# Patient Record
Sex: MALE | Race: BLACK OR AFRICAN AMERICAN | NOT HISPANIC OR LATINO | Employment: FULL TIME | URBAN - METROPOLITAN AREA
[De-identification: names, ages, dates, MRNs, and addresses within clinical notes are randomized per-mention and may not be internally consistent; named-entity substitution may affect disease eponyms.]

---

## 2017-02-02 ENCOUNTER — OFFICE VISIT (OUTPATIENT)
Dept: MEDICAL GROUP | Facility: MEDICAL CENTER | Age: 57
End: 2017-02-02
Payer: COMMERCIAL

## 2017-02-02 VITALS
HEART RATE: 90 BPM | BODY MASS INDEX: 28.1 KG/M2 | TEMPERATURE: 96.7 F | WEIGHT: 226 LBS | OXYGEN SATURATION: 100 % | DIASTOLIC BLOOD PRESSURE: 74 MMHG | HEIGHT: 75 IN | SYSTOLIC BLOOD PRESSURE: 124 MMHG | RESPIRATION RATE: 16 BRPM

## 2017-02-02 DIAGNOSIS — R25.2 MUSCLE CRAMP: ICD-10-CM

## 2017-02-02 DIAGNOSIS — F17.200 TOBACCO USE DISORDER: ICD-10-CM

## 2017-02-02 DIAGNOSIS — Z13.6 SCREENING FOR CARDIOVASCULAR CONDITION: ICD-10-CM

## 2017-02-02 DIAGNOSIS — N52.9 ERECTILE DYSFUNCTION, UNSPECIFIED ERECTILE DYSFUNCTION TYPE: ICD-10-CM

## 2017-02-02 DIAGNOSIS — M62.838 MUSCLE SPASM: ICD-10-CM

## 2017-02-02 PROCEDURE — 99214 OFFICE O/P EST MOD 30 MIN: CPT | Performed by: FAMILY MEDICINE

## 2017-02-02 RX ORDER — SILDENAFIL 100 MG/1
100 TABLET, FILM COATED ORAL PRN
Qty: 10 TAB | Refills: 3 | Status: SHIPPED
Start: 2017-02-02 | End: 2017-11-07 | Stop reason: SDUPTHER

## 2017-02-02 ASSESSMENT — PATIENT HEALTH QUESTIONNAIRE - PHQ9: CLINICAL INTERPRETATION OF PHQ2 SCORE: 0

## 2017-02-02 NOTE — PROGRESS NOTES
cc:  Muscle cramps    Subjective:     Ap Blanco is a 56 y.o. male presenting to discuss the followin. Erectile dysfunction: continues to report having difficulty maintaining an erection when trying to have sex with his long-term girlfriend of 2 years.  This started several months ago, is not improving.  His girlfriend does have some health issues that he is concerned about.  Erection strength and orgasm is normal with manual stimulation.  He tried a sample of viagra from the , thinks that he took 50mg, which was somewhat helpful.  Denies any urinary issues.  No hx of hypertension or diabetes.  No genital rashes, discharge, changes, polyuria.    2. Muscle cramps: has intermittent cramps/spasms in the back of his thighs for at least 2-3 years.  Is near daily, lasts for about 2-3 minutes, very painful. No particular triggers but can happen while in bed or while walking.  Better with nothing.  Worse with nothing.  Has tried stretching, massages, potassium pills with no improvement.  Does not take any meds or supplements    Review of systems:  See above.          Current outpatient prescriptions:   •  sildenafil citrate (VIAGRA) 100 MG tablet, Take 1 Tab by mouth as needed for Erectile Dysfunction., Disp: 10 Tab, Rfl: 3    Allergies   Allergen Reactions   • Aspirin        History reviewed. No pertinent past medical history.  Past Surgical History   Procedure Laterality Date   • Finger orif  10/16/2014     Performed by Henry Montez M.D. at SURGERY [facility]   • Irrigation & debridement general  10/16/2014     Performed by Henry Montez M.D. at SURGERY [facility]     Family History   Problem Relation Age of Onset   • Hypertension Mother    • Lung Disease Brother    • Alcohol/Drug Brother    • Diabetes Mother    • Hypertension Father    • Diabetes Father      Social History     Social History   • Marital Status: Single     Spouse Name: N/A   • Number of Children: N/A   • Years  "of Education: N/A     Occupational History   • Not on file.     Social History Main Topics   • Smoking status: Current Every Day Smoker -- 1.00 packs/day     Types: Cigarettes   • Smokeless tobacco: Never Used   • Alcohol Use: 0.0 oz/week     0 Standard drinks or equivalent per week      Comment: rarely, social occasions   • Drug Use: Yes      Comment: marijuana occasionally   • Sexual Activity:     Partners: Female     Other Topics Concern   • Not on file     Social History Narrative    Is an  at the [employer], works graveyard shift. Youngest of 6 brothers.       Objective:     Vitals: /74 mmHg  Pulse 90  Temp(Src) 35.9 °C (96.7 °F)  Resp 16  Ht 1.905 m (6' 3\")  Wt 102.513 kg (226 lb)  BMI 28.25 kg/m2  SpO2 100%  General: Alert, pleasant, NAD  HEENT: Normocephalic.    Psych:  Affect/mood is normal, judgement is good, memory is intact, grooming is appropriate.    Assessment/Plan:     Ap was seen today for spasms.    Diagnoses and all orders for this visit:    Erectile dysfunction, unspecified erectile dysfunction type  Screening for cardiovascular condition  Offered referral to counseling as his ED is only with his partner and not with manual stimulation, he declined.  Will try viagra 100mg prn. Will also check lipids and a1c.  Advised to quit smoking as well as this may be a contributor    -     HEMOGLOBIN A1C; Future  -     LIPID PROFILE; Future  -     sildenafil citrate (VIAGRA) 100 MG tablet; Take 1 Tab by mouth as needed for Erectile Dysfunction.    Tobacco use disorder  Advised to quit, pt not interested, he will let me know when ready.    Muscle spasm  Muscle cramp  Will check electrolytes, continue to massage and stretch, suggested trying some tonic water.  Could also consider multivitamin/b complex or benadryl.  -     BASIC METABOLIC PANEL; Future  -     FERRITIN; Future  -     MAGNESIUM; Future  -     CREATINE KINASE; Future      Return if symptoms worsen or fail to improve.  "

## 2017-02-02 NOTE — MR AVS SNAPSHOT
"Ap Blanco   2017 9:40 AM   Office Visit   MRN: [id number]    Department:  [address] [place] [address]   Dept Phone:  [phone]    Description:  Male : 1960   Provider:  Sebastián Barreto M.D.           Reason for Visit     Spasms           Allergies as of 2017     Allergen Noted Reactions    Aspirin 10/16/2014         You were diagnosed with     Erectile dysfunction, unspecified erectile dysfunction type   [1487043]       Screening for cardiovascular condition   [632931]       Tobacco use disorder   [305.1.ICD-9-CM]       Muscle spasm   [306752]       Muscle cramp   [807213]         Vital Signs     Blood Pressure Pulse Temperature Respirations Height Weight    124/74 mmHg 90 35.9 °C (96.7 °F) 16 1.905 m (6' 3\") 102.513 kg (226 lb)    Body Mass Index Oxygen Saturation Smoking Status             28.25 kg/m2 100% Current Every Day Smoker         Basic Information     Date Of Birth Sex Race Ethnicity Preferred Language    1960 Male Black or  Non- English      Problem List              ICD-10-CM Priority Class Noted - Resolved    Finger injury S69.90XA   10/16/2014 - Present    Erectile dysfunction N52.9   2016 - Present    Heartburn R12   2016 - Present    Tobacco use disorder F17.200   2016 - Present    Muscle cramp R25.2   2017 - Present      Health Maintenance        Date Due Completion Dates    IMM PNEUMOCOCCAL 19-64 (ADULT) MEDIUM RISK SERIES (1 of 1 - PPSV23) 1978 ---    COLONOSCOPY 2009 ---    IMM INFLUENZA (1) 2016 ---    IMM DTaP/Tdap/Td Vaccine (2 - Td) 10/16/2024 10/16/2014            Current Immunizations     Tdap Vaccine 10/16/2014 12:02 PM      Below and/or attached are the medications your provider expects you to take. Review all of your home medications and newly ordered medications with your provider and/or pharmacist. Follow medication instructions as directed by your provider and/or pharmacist. Please keep your " medication list with you and share with your provider. Update the information when medications are discontinued, doses are changed, or new medications (including over-the-counter products) are added; and carry medication information at all times in the event of emergency situations     Allergies:  ASPIRIN - (reactions not documented)               Medications  Valid as of: February 02, 2017 - 10:31 AM    Generic Name Brand Name Tablet Size Instructions for use    Sildenafil Citrate (Tab) VIAGRA 100 MG Take 1 Tab by mouth as needed for Erectile Dysfunction.        .                 Medicines prescribed today were sent to:     [facility]/PHARMACY #[id number] - [place], [place] - [address]    [address] [place] [address]    Phone: [phone] Fax: [phone]    Open 24 Hours?: No      Medication refill instructions:       If your prescription bottle indicates you have medication refills left, it is not necessary to call your provider’s office. Please contact your pharmacy and they will refill your medication.    If your prescription bottle indicates you do not have any refills left, you may request refills at any time through one of the following ways: The online [employer] system (except Urgent Care), by calling your provider’s office, or by asking your pharmacy to contact your provider’s office with a refill request. Medication refills are processed only during regular business hours and may not be available until the next business day. Your provider may request additional information or to have a follow-up visit with you prior to refilling your medication.   *Please Note: Medication refills are assigned a new Rx number when refilled electronically. Your pharmacy may indicate that no refills were authorized even though a new prescription for the same medication is available at the pharmacy. Please request the medicine by name with the pharmacy before contacting your provider for a refill.        Your To Do List      Future Labs/Procedures Complete By Expires    BASIC METABOLIC PANEL  As directed 2/2/2018    CREATINE KINASE  As directed 2/2/2018    FERRITIN  As directed 2/2/2018    HEMOGLOBIN A1C  As directed 2/2/2018    LIPID PROFILE  As directed 2/2/2018    MAGNESIUM  As directed 2/2/2018         MyChart Status: Patient Declined

## 2017-02-24 ENCOUNTER — TELEPHONE (OUTPATIENT)
Dept: MEDICAL GROUP | Facility: MEDICAL CENTER | Age: 57
End: 2017-02-24

## 2017-02-24 NOTE — TELEPHONE ENCOUNTER
1. Caller Name: Apsugey Blanco                                             Call Back Number: [phone] (home)         Patient approves a detailed voicemail message: N\A    Patient wanted to know if you would please review his labs, he wanted to get his results, I called to [facility] to have the patients labs faxed to our office? Thank you Please advise

## 2017-02-28 NOTE — TELEPHONE ENCOUNTER
Called pt to discuss labs.  Creatinine somewhat elevated but GFR is 62.  Cholesterol was okay, ascvd risk is around 10%, could start a moderate intensity statin.  However, he does complain of leg cramps and his CPK is mildly elevated.  Will hold off on the statin for now, re-consider in the future.  As for the elevated CPK, he denies any muscle weakness, atrophy, weakness.  Discussed referral to neuro/muscle biopsy.  He declined for now, if symptoms worsen or new symptoms arise, he will let me know.  Will mail copy of labs to pt.

## 2017-03-31 ENCOUNTER — OFFICE VISIT (OUTPATIENT)
Dept: MEDICAL GROUP | Facility: CLINIC | Age: 57
End: 2017-03-31
Payer: COMMERCIAL

## 2017-03-31 VITALS
HEART RATE: 74 BPM | BODY MASS INDEX: 28.35 KG/M2 | RESPIRATION RATE: 16 BRPM | OXYGEN SATURATION: 95 % | SYSTOLIC BLOOD PRESSURE: 110 MMHG | WEIGHT: 228 LBS | DIASTOLIC BLOOD PRESSURE: 68 MMHG | HEIGHT: 75 IN | TEMPERATURE: 96.6 F

## 2017-03-31 DIAGNOSIS — H44.002 EYE INFECTION, LEFT: ICD-10-CM

## 2017-03-31 PROCEDURE — 99213 OFFICE O/P EST LOW 20 MIN: CPT | Performed by: NURSE PRACTITIONER

## 2017-03-31 RX ORDER — POLYMYXIN B SULFATE AND TRIMETHOPRIM 1; 10000 MG/ML; [USP'U]/ML
1 SOLUTION OPHTHALMIC EVERY 4 HOURS
Qty: 10 ML | Refills: 0 | Status: SHIPPED | OUTPATIENT
Start: 2017-03-31 | End: 2017-11-07

## 2017-03-31 ASSESSMENT — ENCOUNTER SYMPTOMS
CHILLS: 0
EYE PAIN: 0
PHOTOPHOBIA: 0
BLURRED VISION: 0
EYE DISCHARGE: 1
HEADACHES: 0
EYE REDNESS: 1
FEVER: 0
DOUBLE VISION: 0

## 2017-03-31 NOTE — MR AVS SNAPSHOT
"Ap Blanco   3/31/2017 2:00 PM   Office Visit   MRN: [id number]    Department:  [facility]   Dept Phone:  [phone]    Description:  Male : 1960   Provider:  THERESE Estrada           Reason for Visit     Eye Problem redness/ watery eye/ X 2 days       Allergies as of 3/31/2017     Allergen Noted Reactions    Aspirin 10/16/2014         You were diagnosed with     Eye infection, left   [[id number]]         Vital Signs     Blood Pressure Pulse Temperature Respirations Height Weight    110/68 mmHg 74 35.9 °C (96.6 °F) 16 1.905 m (6' 3\") 103.42 kg (228 lb)    Body Mass Index Oxygen Saturation Smoking Status             28.50 kg/m2 95% Current Every Day Smoker         Basic Information     Date Of Birth Sex Race Ethnicity Preferred Language    1960 Male Black or  Non- English      Problem List              ICD-10-CM Priority Class Noted - Resolved    Finger injury S69.90XA   10/16/2014 - Present    Erectile dysfunction N52.9   2016 - Present    Heartburn R12   2016 - Present    Tobacco use disorder F17.200   2016 - Present    Muscle cramp R25.2   2017 - Present      Health Maintenance        Date Due Completion Dates    IMM PNEUMOCOCCAL 19-64 (ADULT) MEDIUM RISK SERIES (1 of 1 - PPSV23) 1978 ---    COLONOSCOPY 2009 ---    IMM INFLUENZA (1) 2016 ---    IMM DTaP/Tdap/Td Vaccine (2 - Td) 10/16/2024 10/16/2014            Current Immunizations     Tdap Vaccine 10/16/2014 12:02 PM      Below and/or attached are the medications your provider expects you to take. Review all of your home medications and newly ordered medications with your provider and/or pharmacist. Follow medication instructions as directed by your provider and/or pharmacist. Please keep your medication list with you and share with your provider. Update the information when medications are discontinued, doses are changed, or new medications (including " over-the-counter products) are added; and carry medication information at all times in the event of emergency situations     Allergies:  ASPIRIN - (reactions not documented)               Medications  Valid as of: March 31, 2017 -  3:24 PM    Generic Name Brand Name Tablet Size Instructions for use    Polymyxin B-Trimethoprim (Solution) POLYTRIM 98153-5.1 UNIT/ML-% Place 1 Drop in left eye every 4 hours.        Sildenafil Citrate (Tab) VIAGRA 100 MG Take 1 Tab by mouth as needed for Erectile Dysfunction.        .                 Medicines prescribed today were sent to:     [facility]/PHARMACY #[id number] - [place], [place] - [address]    [address] [place] [address]    Phone: [phone] Fax: [phone]    Open 24 Hours?: No      Medication refill instructions:       If your prescription bottle indicates you have medication refills left, it is not necessary to call your provider’s office. Please contact your pharmacy and they will refill your medication.    If your prescription bottle indicates you do not have any refills left, you may request refills at any time through one of the following ways: The online [employer] system (except Urgent Care), by calling your provider’s office, or by asking your pharmacy to contact your provider’s office with a refill request. Medication refills are processed only during regular business hours and may not be available until the next business day. Your provider may request additional information or to have a follow-up visit with you prior to refilling your medication.   *Please Note: Medication refills are assigned a new Rx number when refilled electronically. Your pharmacy may indicate that no refills were authorized even though a new prescription for the same medication is available at the pharmacy. Please request the medicine by name with the pharmacy before contacting your provider for a refill.           MyChart Status: Patient Declined        Quit Tobacco Information     Do you  want to quit using tobacco?    Quitting tobacco decreases risks of cancer, heart and lung disease, increases life expectancy, improves sense of taste and smell, and increases spending money, among other benefits.    If you are thinking about quitting, we can help.  • [facility] Quit Tobacco Program: 255.625.7055  o Program occurs weekly for four weeks and includes pharmacist consultation on products to support quitting smoking or chewing tobacco. A provider referral is needed for pharmacist consultation.  • Tobacco Users Help Hotline: 4-953-QUIT-NOW (242-4215) or https://[place].quitlogix.org/  o Free, confidential telephone and online coaching for [place] residents. Sessions are designed on a schedule that is convenient for you. Eligible clients receive free nicotine replacement therapy.  • Nationally: www.smokefree.gov  o Information and professional assistance to support both immediate and long-term needs as you become, and remain, a non-smoker. Smokefree.gov allows you to choose the help that best fits your needs.

## 2017-03-31 NOTE — PROGRESS NOTES
Chief Complaint   Patient presents with   • Eye Problem     redness/ watery eye/ X 2 days        HISTORY OF PRESENT ILLNESS: Patient is a 56 y.o. male established patient who presents today due to 2 days hx of left red eye. Pt reports that he got scratch by his dog, vaccination uptodate, at home not at outside like backyard about 2 days ago. He then started feeling itchiness and noticed red eye. No feeling of foreign object or sand feeling. No vision change. Noticed yellowish and crusted discharge. He is not wearing contact lens. He went to pharmacy to buy eye drop but was instructed to come to see his doctor first.       Patient Active Problem List    Diagnosis Date Noted   • Muscle cramp 2017   • Erectile dysfunction 2016   • Heartburn 2016   • Tobacco use disorder 2016   • Finger injury 10/16/2014       Allergies:Aspirin    Current Outpatient Prescriptions   Medication Sig Dispense Refill   • sildenafil citrate (VIAGRA) 100 MG tablet Take 1 Tab by mouth as needed for Erectile Dysfunction. 10 Tab 3     No current facility-administered medications for this visit.       Social History   Substance Use Topics   • Smoking status: Current Every Day Smoker -- 1.00 packs/day     Types: Cigarettes   • Smokeless tobacco: Never Used   • Alcohol Use: 0.0 oz/week     0 Standard drinks or equivalent per week      Comment: rarely, social occasions       Family Status   Relation Status Death Age   • Mother     • Father Alive    • Brother Alive    • Brother       Family History   Problem Relation Age of Onset   • Hypertension Mother    • Lung Disease Brother    • Alcohol/Drug Brother    • Diabetes Mother    • Hypertension Father    • Diabetes Father          ROS:  Review of Systems   Constitutional: Negative for fever and chills.   Eyes: Positive for discharge and redness. Negative for blurred vision, double vision, photophobia and pain.   Neurological: Negative for headaches.         "Objective:     Blood pressure 110/68, pulse 74, temperature 35.9 °C (96.6 °F), resp. rate 16, height 1.905 m (6' 3\"), weight 103.42 kg (228 lb), SpO2 95 %.     Physical Exam:  Physical Exam   Constitutional: He is oriented to person, place, and time and well-developed, well-nourished, and in no distress.   HENT:   Head: Normocephalic and atraumatic.   Eyes: EOM are normal. Left eye exhibits discharge. No foreign body present in the left eye. Left conjunctiva is injected. Left conjunctiva has no hemorrhage.   Bilateral eyes stained with fluorescein and evaluated under woods lamp.  No abrasions, ulcerations, corneal injuries or dendritic lesions noted.        Neck: Neck supple. No JVD present.   Cardiovascular: Normal rate.    Neurological: He is alert and oriented to person, place, and time.   Skin: Skin is warm. No rash noted. No erythema.   Vitals reviewed.        Assessment/Plan:  1. Eye infection, left  - polymixin-trimethoprim (POLYTRIM) 98428-3.1 UNIT/ML-% Solution; Place 1 Drop in left eye every 4 hours.  Dispense: 10 mL; Refill: 0  - call if worsening symptoms or new symptoms. Pt verbalized understanding.       "

## 2017-04-20 ENCOUNTER — NON-PROVIDER VISIT (OUTPATIENT)
Dept: OCCUPATIONAL MEDICINE | Facility: CLINIC | Age: 57
End: 2017-04-20

## 2017-04-20 DIAGNOSIS — Z02.1 PRE-EMPLOYMENT DRUG SCREENING: ICD-10-CM

## 2017-04-20 LAB
AMP AMPHETAMINE: NORMAL
COC COCAINE: NORMAL
INT CON NEG: NORMAL
INT CON POS: NORMAL
MET METHAMPHETAMINES: NORMAL
OPI OPIATES: NORMAL
PCP PHENCYCLIDINE: NORMAL
POC DRUG COMMENT 753798-OCCUPATIONAL HEALTH: NORMAL
THC: NORMAL

## 2017-04-20 PROCEDURE — 80305 DRUG TEST PRSMV DIR OPT OBS: CPT | Performed by: PREVENTIVE MEDICINE

## 2017-06-14 ENCOUNTER — NON-PROVIDER VISIT (OUTPATIENT)
Dept: URGENT CARE | Facility: CLINIC | Age: 57
End: 2017-06-14

## 2017-06-14 DIAGNOSIS — Z02.1 PRE-EMPLOYMENT DRUG SCREENING: ICD-10-CM

## 2017-06-14 LAB
AMP AMPHETAMINE: NORMAL
COC COCAINE: NORMAL
INT CON NEG: NEGATIVE
INT CON POS: POSITIVE
MET METHAMPHETAMINES: NORMAL
OPI OPIATES: NORMAL
PCP PHENCYCLIDINE: NORMAL
POC DRUG COMMENT 753798-OCCUPATIONAL HEALTH: NORMAL
THC: NORMAL

## 2017-06-14 PROCEDURE — 80305 DRUG TEST PRSMV DIR OPT OBS: CPT | Performed by: PHYSICIAN ASSISTANT

## 2017-11-07 ENCOUNTER — OFFICE VISIT (OUTPATIENT)
Dept: MEDICAL GROUP | Facility: MEDICAL CENTER | Age: 57
End: 2017-11-07
Payer: COMMERCIAL

## 2017-11-07 VITALS
RESPIRATION RATE: 14 BRPM | OXYGEN SATURATION: 96 % | SYSTOLIC BLOOD PRESSURE: 122 MMHG | DIASTOLIC BLOOD PRESSURE: 74 MMHG | TEMPERATURE: 98.6 F | BODY MASS INDEX: 27.23 KG/M2 | HEIGHT: 75 IN | WEIGHT: 219 LBS | HEART RATE: 74 BPM

## 2017-11-07 DIAGNOSIS — N52.9 ERECTILE DYSFUNCTION, UNSPECIFIED ERECTILE DYSFUNCTION TYPE: ICD-10-CM

## 2017-11-07 DIAGNOSIS — Z12.11 SCREEN FOR COLON CANCER: ICD-10-CM

## 2017-11-07 DIAGNOSIS — F17.200 TOBACCO USE DISORDER: ICD-10-CM

## 2017-11-07 DIAGNOSIS — Z13.6 SCREENING FOR CARDIOVASCULAR CONDITION: ICD-10-CM

## 2017-11-07 PROCEDURE — 99213 OFFICE O/P EST LOW 20 MIN: CPT | Performed by: FAMILY MEDICINE

## 2017-11-07 RX ORDER — SILDENAFIL 100 MG/1
100 TABLET, FILM COATED ORAL PRN
Qty: 10 TAB | Refills: 3 | Status: SHIPPED | OUTPATIENT
Start: 2017-11-07

## 2017-11-25 ENCOUNTER — HOSPITAL ENCOUNTER (OUTPATIENT)
Dept: LAB | Facility: MEDICAL CENTER | Age: 57
End: 2017-11-25
Attending: FAMILY MEDICINE
Payer: COMMERCIAL

## 2017-11-25 ENCOUNTER — HOSPITAL ENCOUNTER (OUTPATIENT)
Facility: MEDICAL CENTER | Age: 57
End: 2017-11-25
Attending: FAMILY MEDICINE
Payer: COMMERCIAL

## 2017-11-25 DIAGNOSIS — Z13.6 SCREENING FOR CARDIOVASCULAR CONDITION: ICD-10-CM

## 2017-11-25 DIAGNOSIS — F17.200 TOBACCO USE DISORDER: ICD-10-CM

## 2017-11-25 LAB
ALBUMIN SERPL BCP-MCNC: 3.6 G/DL (ref 3.2–4.9)
ALBUMIN/GLOB SERPL: 1.1 G/DL
ALP SERPL-CCNC: 52 U/L (ref 30–99)
ALT SERPL-CCNC: 24 U/L (ref 2–50)
ANION GAP SERPL CALC-SCNC: 9 MMOL/L (ref 0–11.9)
AST SERPL-CCNC: 18 U/L (ref 12–45)
BASOPHILS # BLD AUTO: 0.3 % (ref 0–1.8)
BASOPHILS # BLD: 0.01 K/UL (ref 0–0.12)
BILIRUB SERPL-MCNC: 0.3 MG/DL (ref 0.1–1.5)
BUN SERPL-MCNC: 23 MG/DL (ref 8–22)
CALCIUM SERPL-MCNC: 9.3 MG/DL (ref 8.5–10.5)
CHLORIDE SERPL-SCNC: 109 MMOL/L (ref 96–112)
CHOLEST SERPL-MCNC: 123 MG/DL (ref 100–199)
CO2 SERPL-SCNC: 24 MMOL/L (ref 20–33)
CREAT SERPL-MCNC: 1.33 MG/DL (ref 0.5–1.4)
EOSINOPHIL # BLD AUTO: 0.07 K/UL (ref 0–0.51)
EOSINOPHIL NFR BLD: 1.8 % (ref 0–6.9)
ERYTHROCYTE [DISTWIDTH] IN BLOOD BY AUTOMATED COUNT: 49.1 FL (ref 35.9–50)
GFR SERPL CREATININE-BSD FRML MDRD: 55 ML/MIN/1.73 M 2
GLOBULIN SER CALC-MCNC: 3.3 G/DL (ref 1.9–3.5)
GLUCOSE SERPL-MCNC: 100 MG/DL (ref 65–99)
HCT VFR BLD AUTO: 47.8 % (ref 42–52)
HDLC SERPL-MCNC: 47 MG/DL
HGB BLD-MCNC: 15.9 G/DL (ref 14–18)
IMM GRANULOCYTES # BLD AUTO: 0.01 K/UL (ref 0–0.11)
IMM GRANULOCYTES NFR BLD AUTO: 0.3 % (ref 0–0.9)
LDLC SERPL CALC-MCNC: 61 MG/DL
LYMPHOCYTES # BLD AUTO: 2.09 K/UL (ref 1–4.8)
LYMPHOCYTES NFR BLD: 54 % (ref 22–41)
MCH RBC QN AUTO: 31 PG (ref 27–33)
MCHC RBC AUTO-ENTMCNC: 33.3 G/DL (ref 33.7–35.3)
MCV RBC AUTO: 93.2 FL (ref 81.4–97.8)
MONOCYTES # BLD AUTO: 0.34 K/UL (ref 0–0.85)
MONOCYTES NFR BLD AUTO: 8.8 % (ref 0–13.4)
NEUTROPHILS # BLD AUTO: 1.35 K/UL (ref 1.82–7.42)
NEUTROPHILS NFR BLD: 34.8 % (ref 44–72)
NRBC # BLD AUTO: 0 K/UL
NRBC BLD AUTO-RTO: 0 /100 WBC
PLATELET # BLD AUTO: 213 K/UL (ref 164–446)
PMV BLD AUTO: 9.2 FL (ref 9–12.9)
POTASSIUM SERPL-SCNC: 4.5 MMOL/L (ref 3.6–5.5)
PROT SERPL-MCNC: 6.9 G/DL (ref 6–8.2)
RBC # BLD AUTO: 5.13 M/UL (ref 4.7–6.1)
SODIUM SERPL-SCNC: 142 MMOL/L (ref 135–145)
TRIGL SERPL-MCNC: 74 MG/DL (ref 0–149)
TSH SERPL DL<=0.005 MIU/L-ACNC: 1.92 UIU/ML (ref 0.3–3.7)
WBC # BLD AUTO: 3.9 K/UL (ref 4.8–10.8)

## 2017-11-25 PROCEDURE — 80053 COMPREHEN METABOLIC PANEL: CPT

## 2017-11-25 PROCEDURE — 36415 COLL VENOUS BLD VENIPUNCTURE: CPT

## 2017-11-25 PROCEDURE — 84443 ASSAY THYROID STIM HORMONE: CPT

## 2017-11-25 PROCEDURE — 80061 LIPID PANEL: CPT

## 2017-11-25 PROCEDURE — 85025 COMPLETE CBC W/AUTO DIFF WBC: CPT

## 2017-11-25 PROCEDURE — 82274 ASSAY TEST FOR BLOOD FECAL: CPT

## 2017-12-05 DIAGNOSIS — Z12.11 SCREEN FOR COLON CANCER: ICD-10-CM

## 2017-12-05 LAB — HEMOCCULT STL QL IA: NEGATIVE

## 2018-07-16 ENCOUNTER — OFFICE VISIT (OUTPATIENT)
Dept: INTERNAL MEDICINE | Facility: MEDICAL CENTER | Age: 58
End: 2018-07-16
Payer: COMMERCIAL

## 2018-07-16 VITALS
RESPIRATION RATE: 20 BRPM | WEIGHT: 218 LBS | BODY MASS INDEX: 27.1 KG/M2 | HEIGHT: 75 IN | SYSTOLIC BLOOD PRESSURE: 114 MMHG | OXYGEN SATURATION: 94 % | HEART RATE: 76 BPM | TEMPERATURE: 98 F | DIASTOLIC BLOOD PRESSURE: 76 MMHG

## 2018-07-16 DIAGNOSIS — F17.200 TOBACCO USE DISORDER: ICD-10-CM

## 2018-07-16 DIAGNOSIS — T75.3XXA SEA SICKNESS, INITIAL ENCOUNTER: ICD-10-CM

## 2018-07-16 DIAGNOSIS — M54.50 ACUTE MIDLINE LOW BACK PAIN WITHOUT SCIATICA: ICD-10-CM

## 2018-07-16 PROCEDURE — 99213 OFFICE O/P EST LOW 20 MIN: CPT | Mod: GE | Performed by: INTERNAL MEDICINE

## 2018-07-16 RX ORDER — CYCLOBENZAPRINE HCL 10 MG
5 TABLET ORAL 3 TIMES DAILY PRN
Qty: 15 TAB | Refills: 0 | Status: SHIPPED | OUTPATIENT
Start: 2018-07-16 | End: 2018-07-21

## 2018-07-16 RX ORDER — IBUPROFEN 800 MG/1
800 TABLET ORAL EVERY 8 HOURS PRN
Qty: 15 TAB | Refills: 1 | Status: SHIPPED | OUTPATIENT
Start: 2018-07-16

## 2018-07-16 RX ORDER — SCOLOPAMINE TRANSDERMAL SYSTEM 1 MG/1
1 PATCH, EXTENDED RELEASE TRANSDERMAL
Qty: 4 PATCH | Refills: 3 | Status: SHIPPED | OUTPATIENT
Start: 2018-07-16

## 2018-07-16 ASSESSMENT — PAIN SCALES - GENERAL: PAINLEVEL: 8=MODERATE-SEVERE PAIN

## 2018-07-16 NOTE — PROGRESS NOTES
"Established Patient    Ap presents today with the following:    CC: Acute back pain    HPI: , 57 year old male with past medical history significant for erectile dysfunction, tobacco use presents to the clinic for c/o acute back pain.    Reports that his back pain in his lower back, has started a week ago, when he was getting out of his car, sudden in onset, has it mostly in the morning, 7-8/10, gets better later in the day, aggravating by moving and bending, relieved with ibuprofen and rest. Denies tingling, numbness, weakness and no radiation of the pain. Denies urinary or bowel incontinence, dysuria/hematuria. Never had similar episodes in the past. Works as an  and walks a lot as a part of his job. Denies fever, chills, nausea, vomiting, cough, shortness of breath, abdominal pain, chest pain, urinary or bowel symptoms. He is recovering from a viral illness.     Continues to smoke about 1 PPD, trying to cut down.    Patient Active Problem List    Diagnosis Date Noted   • Muscle cramp 02/02/2017   • Erectile dysfunction 09/13/2016   • Heartburn 09/13/2016   • Tobacco use disorder 09/13/2016       Current Outpatient Prescriptions   Medication Sig Dispense Refill   • cyclobenzaprine (FLEXERIL) 10 MG Tab Take 0.5 Tabs by mouth 3 times a day as needed for Moderate Pain or Muscle Spasms for up to 5 days. 15 Tab 0   • ibuprofen (MOTRIN) 800 MG Tab Take 1 Tab by mouth every 8 hours as needed for Moderate Pain. 15 Tab 1   • scopolamine (TRANSDERM-SCOP) 1 mg/72hr PATCH 72 HR Apply 1 Patch to skin as directed every 72 hours. 4 Patch 3   • sildenafil citrate (VIAGRA) 100 MG tablet Take 1 Tab by mouth as needed for Erectile Dysfunction. 10 Tab 3     No current facility-administered medications for this visit.        ROS: As per HPI. Additional pertinent symptoms as noted below.    All others negative    /76   Pulse 76   Temp 36.7 °C (98 °F)   Resp 20   Ht 1.905 m (6' 3\")   Wt 98.9 kg (218 lb)  "  SpO2 94%   BMI 27.25 kg/m²     Physical Exam   Constitutional:  oriented to person, place, and time. In mild distress.   Eyes: Pupils are equal, round, and reactive to light. No scleral icterus.  Neck: Neck supple. No thyromegaly present.   Cardiovascular: Normal rate, regular rhythm and normal heart sounds.  Exam reveals no gallop and no friction rub.  No murmur heard.  Pulmonary/Chest: Breath sounds normal. Chest wall is not dull to percussion.   Musculoskeletal:   no edema.   Lymphadenopathy: no cervical adenopathy  Neurological: alert and oriented to person, place, and time.   Skin: No cyanosis. Nails show no clubbing.  Spine : Tenderness in the lumbosacral spine and towards bilateral flank region. Strength equal in all 4 extremities.     Assessment and Plan    # Acute midline low back pain without sciatica  - reports pain for a week, postural and relieved with ibuprofen  - no red flag signs, likely musculoskeletal  - will give ibuprofen 800 mg BID PRN, to be taken with food  - ordered flexeril 5 mg , TID PRN  - follow up in 3 weeks if symptoms do not get better/get worse    # Tobacco use disorder  -continues to smoke , counseling to quit provided    # Sea sickness  - he is planning a trip in September and is requesting medication, he has used patches in the past.  - ordered scopolamine patches.    Follow up in 3 weeks as needed for symptoms. Follow up with PCP otherwise.    Signed by: Lidya Ramey M.D.

## 2018-07-16 NOTE — PATIENT INSTRUCTIONS
Back Exercises  Back exercises help treat and prevent back injuries. The goal of back exercises is to increase the strength of your abdominal and back muscles and the flexibility of your back. These exercises should be started when you no longer have back pain. Back exercises include:  · Pelvic Tilt. Lie on your back with your knees bent. Tilt your pelvis until the lower part of your back is against the floor. Hold this position 5 to 10 sec and repeat 5 to 10 times.  · Knee to Chest. Pull first 1 knee up against your chest and hold for 20 to 30 seconds, repeat this with the other knee, and then both knees. This may be done with the other leg straight or bent, whichever feels better.  · Sit-Ups or Curl-Ups. Bend your knees 90 degrees. Start with tilting your pelvis, and do a partial, slow sit-up, lifting your trunk only 30 to 45 degrees off the floor. Take at least 2 to 3 seconds for each sit-up. Do not do sit-ups with your knees out straight. If partial sit-ups are difficult, simply do the above but with only tightening your abdominal muscles and holding it as directed.  · Hip-Lift. Lie on your back with your knees flexed 90 degrees. Push down with your feet and shoulders as you raise your hips a couple inches off the floor; hold for 10 seconds, repeat 5 to 10 times.  · Back arches. Lie on your stomach, propping yourself up on bent elbows. Slowly press on your hands, causing an arch in your low back. Repeat 3 to 5 times. Any initial stiffness and discomfort should lessen with repetition over time.  · Shoulder-Lifts. Lie face down with arms beside your body. Keep hips and torso pressed to floor as you slowly lift your head and shoulders off the floor.  Do not overdo your exercises, especially in the beginning. Exercises may cause you some mild back discomfort which lasts for a few minutes; however, if the pain is more severe, or lasts for more than 15 minutes, do not continue exercises until you see your  caregiver. Improvement with exercise therapy for back problems is slow.   See your caregivers for assistance with developing a proper back exercise program.     This information is not intended to replace advice given to you by your health care provider. Make sure you discuss any questions you have with your health care provider.     Document Released: 01/25/2006 Document Revised: 03/11/2013 Document Reviewed: 02/11/2016  [employer] Patient Education ©2016 [employer]    Use heat/ cold as needed for pain relief.  Use stretching exercises mentioned above, only do it if it does not hurt to do them.  Follow up in 3 weeks if symptoms do not get better or get worse.

## 2018-10-31 ENCOUNTER — OFFICE VISIT (OUTPATIENT)
Dept: MEDICAL GROUP | Facility: MEDICAL CENTER | Age: 58
End: 2018-10-31
Payer: COMMERCIAL

## 2018-10-31 VITALS
SYSTOLIC BLOOD PRESSURE: 122 MMHG | TEMPERATURE: 97.9 F | BODY MASS INDEX: 27.83 KG/M2 | RESPIRATION RATE: 12 BRPM | WEIGHT: 223.8 LBS | OXYGEN SATURATION: 96 % | DIASTOLIC BLOOD PRESSURE: 78 MMHG | HEART RATE: 74 BPM | HEIGHT: 75 IN

## 2018-10-31 DIAGNOSIS — M79.672 PAIN OF LEFT HEEL: ICD-10-CM

## 2018-10-31 PROCEDURE — 99213 OFFICE O/P EST LOW 20 MIN: CPT | Performed by: FAMILY MEDICINE

## 2018-10-31 ASSESSMENT — PATIENT HEALTH QUESTIONNAIRE - PHQ9: CLINICAL INTERPRETATION OF PHQ2 SCORE: 0

## 2018-10-31 NOTE — PROGRESS NOTES
"Chief Complaint   Patient presents with   • Foot Pain       Subjective:     HPI:   Ap Blanco presents today with the followin. Pain of left heel  He has had significant left heel pain now for over 2 months.  At first he thought it was plantar fasciitis and had topical lidocaine over-the-counter as well as stretching and changes and issues to address this.  The right heel has improved but the left has not changed significantly.  He is on his feet walking the majority of his work day.  He has the pain in the region of plantar fasciitis tenderness but stretching the foot does not elicit symptoms in that area.  Denies significant swelling.  Denies any trauma to the feet.        Patient Active Problem List    Diagnosis Date Noted   • Muscle cramp 2017   • Erectile dysfunction 2016   • Heartburn 2016   • Tobacco use disorder 2016       Current medicines (including changes today)  Current Outpatient Prescriptions   Medication Sig Dispense Refill   • ibuprofen (MOTRIN) 800 MG Tab Take 1 Tab by mouth every 8 hours as needed for Moderate Pain. 15 Tab 1   • scopolamine (TRANSDERM-SCOP) 1 mg/72hr PATCH 72 HR Apply 1 Patch to skin as directed every 72 hours. 4 Patch 3   • sildenafil citrate (VIAGRA) 100 MG tablet Take 1 Tab by mouth as needed for Erectile Dysfunction. 10 Tab 3     No current facility-administered medications for this visit.        Allergies   Allergen Reactions   • Aspirin        ROS: As per HPI       Objective:     Blood pressure 122/78, pulse 74, temperature 36.6 °C (97.9 °F), resp. rate 12, height 1.905 m (6' 3\"), weight 101.5 kg (223 lb 12.8 oz), SpO2 96 %. Body mass index is 27.97 kg/m².    Physical Exam:  Constitutional: Well-developed and well-nourished. Not diaphoretic. No distress. Lucid and fluent.  Skin: Skin is warm and dry. No rash noted.  Head: Atraumatic without lesions.  Eyes: Conjunctivae and extraocular motions are normal. Pupils are equal, round, and " reactive to light. No scleral icterus.   Mouth/Throat: Tongue normal. Oropharynx is clear and moist. Posterior pharynx without erythema or exudates.  Neck: Supple, trachea midline. No thyromegaly present. No cervical or supraclavicular lymphadenopathy. No JVD or carotid bruits appreciated  Cardiovascular: Regular rate and rhythm.  Normal S1, S2 without murmur appreciated.  Chest: Effort normal. Clear to auscultation throughout. No adventitious sounds.   Extremities: No cyanosis, clubbing, erythema, nor edema.  Left heel tenderness is in the plantar fasciitis region.  Neurological: Alert and oriented x 3.  Movements are generally strong and symmetric.  Gait shows very slight limp.  Psychiatric:  Behavior, mood, and affect are appropriate.       Assessment and Plan:     57 y.o. male with the following issues:    1. Pain of left heel  DX-OS CALCIS (HEEL) 2+ LEFT    REFERRAL TO PODIATRY         Followup: Return if symptoms worsen or fail to improve.

## 2018-11-03 ENCOUNTER — HOSPITAL ENCOUNTER (OUTPATIENT)
Dept: RADIOLOGY | Facility: MEDICAL CENTER | Age: 58
End: 2018-11-03
Attending: FAMILY MEDICINE
Payer: COMMERCIAL

## 2018-11-03 DIAGNOSIS — M79.672 PAIN OF LEFT HEEL: ICD-10-CM

## 2018-11-03 PROCEDURE — 73650 X-RAY EXAM OF HEEL: CPT | Mod: LT

## 2019-03-18 ENCOUNTER — OFFICE VISIT (OUTPATIENT)
Dept: MEDICAL GROUP | Facility: MEDICAL CENTER | Age: 59
End: 2019-03-18
Payer: COMMERCIAL

## 2019-03-18 VITALS
OXYGEN SATURATION: 94 % | WEIGHT: 229 LBS | HEART RATE: 77 BPM | BODY MASS INDEX: 28.47 KG/M2 | TEMPERATURE: 98.8 F | SYSTOLIC BLOOD PRESSURE: 118 MMHG | DIASTOLIC BLOOD PRESSURE: 74 MMHG | HEIGHT: 75 IN

## 2019-03-18 DIAGNOSIS — F17.200 TOBACCO USE DISORDER: ICD-10-CM

## 2019-03-18 DIAGNOSIS — R19.09 UMBILICAL MASS: ICD-10-CM

## 2019-03-18 PROCEDURE — 99213 OFFICE O/P EST LOW 20 MIN: CPT | Performed by: INTERNAL MEDICINE

## 2019-03-19 NOTE — ASSESSMENT & PLAN NOTE
He smokes about a pack per day.  He is ready to quit because his wife has to quit because of health problems.  He was told by Dr. Barreto to contact her when he was ready to quit smoking.

## 2019-03-19 NOTE — ASSESSMENT & PLAN NOTE
This patient comes in today reporting that about last Wednesday he noticed a lump in the area of his umbilicus.  He reports no discomfort.  He has had no change in urinating or change in bowel habits.  He otherwise feels well.

## 2019-03-19 NOTE — PROGRESS NOTES
Subjective:     Chief Complaint   Patient presents with   • Lump     [employer], x6 days     Ap Blanco is a 58 y.o. male here today for evaluation of a lump that he found next to his umbilicus.    Umbilical mass  This patient comes in today reporting that about last Wednesday he noticed a lump in the area of his umbilicus.  He reports no discomfort.  He has had no change in urinating or change in bowel habits.  He otherwise feels well.  The lump is not easily reducible.    Tobacco use disorder  He smokes about a pack per day.  He is ready to quit because his wife has to quit because of health problems.  He was told by Dr. Barreto to contact her when he was ready to quit smoking.       Diagnoses of Umbilical mass and Tobacco use disorder were pertinent to this visit.    Allergies: Aspirin  Current medicines (including changes today)  Current Outpatient Prescriptions   Medication Sig Dispense Refill   • ibuprofen (MOTRIN) 800 MG Tab Take 1 Tab by mouth every 8 hours as needed for Moderate Pain. (Patient not taking: Reported on 3/18/2019) 15 Tab 1   • scopolamine (TRANSDERM-SCOP) 1 mg/72hr PATCH 72 HR Apply 1 Patch to skin as directed every 72 hours. (Patient not taking: Reported on 3/18/2019) 4 Patch 3   • sildenafil citrate (VIAGRA) 100 MG tablet Take 1 Tab by mouth as needed for Erectile Dysfunction. (Patient not taking: Reported on 3/18/2019) 10 Tab 3     No current facility-administered medications for this visit.        He  has no past medical history on file.    ROS    Patient denies significant change in strength, weight or appetite.  No significant lightheadedness or headaches.  No change in vision, hearing, or swallowing.  No new dyspnea, coughing, chest pain, or palpitations.  No indigestion, abdominal pain, or change in bowel habits.  No change in urinating.  No new ankle swelling.       Objective:     PE:  /74 (BP Location: Left arm, Patient Position: Sitting)   Pulse 77   Temp 37.1 °C (98.8  "°F)   Ht 1.905 m (6' 3\")   Wt 103.9 kg (229 lb)   SpO2 94%   BMI 28.62 kg/m²    Neck is supple without significant lymphadenopathy or masses.  Lungs are clear with normal breath sounds without wheezes or rales .  Cardiovascular: peripheral circulation is satisfactory, heart sounds are unchanged and unremarkable.  Abdomen is soft, without tenderness, with normal bowel sounds.  There is a firm mass just above the umbilicus that is not reducible.  It is not tender to palpation.  There is no significant surrounding redness.  Extremities are without significant edema, cyanosis or deformity.      Assessment and Plan:   The following treatment plan was discussed  1. Umbilical mass  US-ABDOMEN LTD (SOFT TISSUE)    Because the mass is firm, we will proceed with an ultrasound to further delineate the mass.  Further evaluation or treatment as indicated.   2. Tobacco use disorder      We briefly reviewed options in stopping smoking as well as the value in quitting.  He will review with Dr. Barreto       Followup: He will follow-up in the near future with Dr. Barreto.         "

## 2019-03-25 ENCOUNTER — TELEPHONE (OUTPATIENT)
Dept: MEDICAL GROUP | Facility: MEDICAL CENTER | Age: 59
End: 2019-03-25

## 2019-03-25 DIAGNOSIS — F17.210 CIGARETTE NICOTINE DEPENDENCE WITHOUT COMPLICATION: ICD-10-CM

## 2019-03-25 NOTE — TELEPHONE ENCOUNTER
1. Caller Name: Ap Blanco                        Call Back Number: [phone] (home)     2. Message: pt said he is ready to quit smoking, the patches are not working, needs to know what program you were talking about last time you said when he is ready to call you    3. Patient approves office to leave a detailed voicemail/MyChart message: yes

## 2019-03-25 NOTE — TELEPHONE ENCOUNTER
Referral to the tobacco cessation program placed, this will be a consult with the pharmacist as well to see if there is anything else we can try to help him quit

## 2019-04-06 ENCOUNTER — HOSPITAL ENCOUNTER (OUTPATIENT)
Dept: RADIOLOGY | Facility: MEDICAL CENTER | Age: 59
End: 2019-04-06
Attending: INTERNAL MEDICINE
Payer: COMMERCIAL

## 2019-04-06 DIAGNOSIS — R19.09 UMBILICAL MASS: ICD-10-CM

## 2019-04-06 PROCEDURE — 76705 ECHO EXAM OF ABDOMEN: CPT
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
## 2020-05-21 ENCOUNTER — HOSPITAL ENCOUNTER (OUTPATIENT)
Facility: MEDICAL CENTER | Age: 60
End: 2020-05-21
Payer: COMMERCIAL

## 2020-05-26 LAB
SARS-COV-2 RNA SPEC QL NAA+PROBE: NOT DETECTED
SPECIMEN SOURCE: NORMAL

## 2021-03-15 DIAGNOSIS — Z23 NEED FOR VACCINATION: ICD-10-CM

## 2021-06-01 ENCOUNTER — NON-PROVIDER VISIT (OUTPATIENT)
Dept: OCCUPATIONAL MEDICINE | Facility: CLINIC | Age: 61
End: 2021-06-01

## 2021-06-01 DIAGNOSIS — Z02.1 PRE-EMPLOYMENT DRUG SCREENING: ICD-10-CM

## 2021-06-01 PROCEDURE — 80305 DRUG TEST PRSMV DIR OPT OBS: CPT | Performed by: NURSE PRACTITIONER

## 2023-09-15 ENCOUNTER — DOCUMENTATION (OUTPATIENT)
Dept: HEALTH INFORMATION MANAGEMENT | Facility: OTHER | Age: 63
End: 2023-09-15

## 2023-12-14 ENCOUNTER — TELEPHONE (OUTPATIENT)
Dept: HEALTH INFORMATION MANAGEMENT | Facility: OTHER | Age: 63
End: 2023-12-14

## 2024-01-23 ENCOUNTER — OFFICE VISIT (OUTPATIENT)
Dept: MEDICAL GROUP | Facility: MEDICAL CENTER | Age: 64
End: 2024-01-23
Payer: COMMERCIAL

## 2024-01-23 VITALS
OXYGEN SATURATION: 97 % | BODY MASS INDEX: 27.48 KG/M2 | WEIGHT: 221 LBS | SYSTOLIC BLOOD PRESSURE: 104 MMHG | HEART RATE: 69 BPM | DIASTOLIC BLOOD PRESSURE: 66 MMHG | TEMPERATURE: 98.3 F | HEIGHT: 75 IN

## 2024-01-23 DIAGNOSIS — F17.210 CIGARETTE NICOTINE DEPENDENCE WITHOUT COMPLICATION: ICD-10-CM

## 2024-01-23 DIAGNOSIS — Z12.12 SCREENING FOR COLORECTAL CANCER: ICD-10-CM

## 2024-01-23 DIAGNOSIS — Z12.11 SCREENING FOR COLORECTAL CANCER: ICD-10-CM

## 2024-01-23 DIAGNOSIS — M54.12 CERVICAL RADICULOPATHY: ICD-10-CM

## 2024-01-23 PROBLEM — M19.011 OSTEOARTHRITIS OF RIGHT SHOULDER: Status: ACTIVE | Noted: 2024-01-23

## 2024-01-23 PROCEDURE — 3078F DIAST BP <80 MM HG: CPT | Performed by: PHYSICIAN ASSISTANT

## 2024-01-23 PROCEDURE — 3074F SYST BP LT 130 MM HG: CPT | Performed by: PHYSICIAN ASSISTANT

## 2024-01-23 PROCEDURE — 99203 OFFICE O/P NEW LOW 30 MIN: CPT | Performed by: PHYSICIAN ASSISTANT

## 2024-01-23 NOTE — ASSESSMENT & PLAN NOTE
Chronic and unstable  Smoking cessatiSmokes 1/2 pack a day for many years.  Not interested in CT at this time for lung cancer screeningon discussed at length

## 2024-01-23 NOTE — ASSESSMENT & PLAN NOTE
Chronic and unstable  He has an acute on chronic exacerbation: I will put in a referral to [facility].  He is using anti-inflammatories and recommend that plus muscle relaxer.  May do well with epidural and he will do this after he gets back from visiting with his daughter

## 2024-01-23 NOTE — PROGRESS NOTES
Subjective:   Ap Blanco is a 63 y.o. male here today for     HPI: Patient is here to discuss:  Problem   Cervical Radiculopathy    January 23, 2024: Has a history of bilateral shoulder discomfort but worse on the right.  Has had epidurals into the upper trapezius and neck region because a history of right thumb going numb when he sleeps.  This is been intermittent for a few years but has been really bad in the last 2 weeks.  Did see [facility] and got an epidural 2 years ago which resolved the symptoms but is flared up.  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  Has tried steroid packs but they keep him awake has used NSAIDs as well and muscle relaxers.     Cigarette Nicotine Dependence Without Complication    Smokes 1/2 pack a day for many years.  Not interested in CT at this time for lung cancer screening            Current medicines (including changes today)  Current Outpatient Medications   Medication Sig Dispense Refill    cyclobenzaprine (FLEXERIL) 5 mg tablet Take 1-2 Tablets by mouth at bedtime as needed for Muscle Spasms. 30 Tablet 0    ibuprofen (MOTRIN) 800 MG Tab Take 1 Tab by mouth every 8 hours as needed for Moderate Pain. (Patient not taking: Reported on 11/24/2021) 15 Tab 1    scopolamine (TRANSDERM-SCOP) 1 mg/72hr PATCH 72 HR Apply 1 Patch to skin as directed every 72 hours. 4 Patch 3    sildenafil citrate (VIAGRA) 100 MG tablet Take 1 Tab by mouth as needed for Erectile Dysfunction. 10 Tab 3     No current facility-administered medications for this visit.     He  has no past medical history on file.  Aspirin     Social History and Family History were reviewed and updated.    ROS   No headaches, chest pain, no shortness of breath, abdominal pain, nausea, or vomiting.  All other systems were reviewed and are negative or noted as positive in the HPI.       Objective:     /66 (BP Location: Left arm, Patient Position: Sitting, BP Cuff Size: Adult)   Pulse  "69   Temp 36.8 °C (98.3 °F) (Temporal)   Ht 1.905 m (6' 3\")   Wt 100 kg (221 lb)   SpO2 97%  Body mass index is 27.62 kg/m².     Physical Exam:  General: Patient appears well-nourished, well-hydrated, nontoxic  HEENT, normocephalic atraumatic, PERRLA, extraocular movements intact, nares are patent and clear  Neck: No visible masses, thyromegaly or abnormalities noted  Cardiovascular.  Sitting comfortably without visible signs of edema  Lungs: No cyanosis noted, nondyspneic  Skin: Well perfused without evidence of rash or lesions  Neurological: Cranial nerves II through XII intact, normal gait  Musculoskeletal: Normal range of motion, normal strength and no deficit noted     Clinical Course/Lab Analysis:        Assessment and Plan:   The following treatment plan was discussed.  Signs and symptoms for which to return were discussed with patient at length.  Patient verbalized understanding.    Problem List Items Addressed This Visit       Cervical radiculopathy     Chronic and unstable  He has an acute on chronic exacerbation: I will put in a referral to [facility].  He is using anti-inflammatories and recommend that plus muscle relaxer.  May do well with epidural and he will do this after he gets back from visiting with his daughter         Relevant Orders    Referral to Pain Management    Cigarette nicotine dependence without complication     Chronic and unstable  Smoking cessatiSmokes 1/2 pack a day for many years.  Not interested in CT at this time for lung cancer screeningon discussed at length            Other Visit Diagnoses       Screening for colorectal cancer        Relevant Orders    COLOGUARD (FIT DNA)               Followup:    Please note that this dictation was created using voice recognition software. I have made every reasonable attempt to correct obvious errors, but I expect that there are errors of grammar and possibly content that I did not discover before finalizing the note.             "

## 2024-08-27 ENCOUNTER — OFFICE VISIT (OUTPATIENT)
Dept: URGENT CARE | Facility: PHYSICIAN GROUP | Age: 64
End: 2024-08-27
Payer: COMMERCIAL

## 2024-08-27 VITALS
OXYGEN SATURATION: 96 % | HEIGHT: 75 IN | DIASTOLIC BLOOD PRESSURE: 76 MMHG | BODY MASS INDEX: 24.92 KG/M2 | WEIGHT: 200.4 LBS | RESPIRATION RATE: 17 BRPM | HEART RATE: 94 BPM | TEMPERATURE: 98.1 F | SYSTOLIC BLOOD PRESSURE: 134 MMHG

## 2024-08-27 DIAGNOSIS — M54.31 SCIATICA OF RIGHT SIDE: Primary | ICD-10-CM

## 2024-08-27 RX ORDER — PREDNISONE 20 MG/1
20 TABLET ORAL DAILY
Qty: 5 TABLET | Refills: 0 | Status: SHIPPED | OUTPATIENT
Start: 2024-08-27 | End: 2024-09-01

## 2024-08-27 RX ORDER — KETOROLAC TROMETHAMINE 15 MG/ML
15 INJECTION, SOLUTION INTRAMUSCULAR; INTRAVENOUS ONCE
Status: COMPLETED | OUTPATIENT
Start: 2024-08-27 | End: 2024-08-27

## 2024-08-27 RX ADMIN — KETOROLAC TROMETHAMINE 15 MG: 15 INJECTION, SOLUTION INTRAMUSCULAR; INTRAVENOUS at 18:35
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
